# Patient Record
Sex: FEMALE | Race: WHITE | NOT HISPANIC OR LATINO | Employment: OTHER | URBAN - METROPOLITAN AREA
[De-identification: names, ages, dates, MRNs, and addresses within clinical notes are randomized per-mention and may not be internally consistent; named-entity substitution may affect disease eponyms.]

---

## 2021-06-02 ENCOUNTER — RECORDS - HEALTHEAST (OUTPATIENT)
Dept: ADMINISTRATIVE | Facility: CLINIC | Age: 71
End: 2021-06-02

## 2023-11-17 ENCOUNTER — MEDICAL CORRESPONDENCE (OUTPATIENT)
Dept: HEALTH INFORMATION MANAGEMENT | Facility: CLINIC | Age: 73
End: 2023-11-17
Payer: COMMERCIAL

## 2023-11-28 ENCOUNTER — HOSPITAL ENCOUNTER (OUTPATIENT)
Dept: GENERAL RADIOLOGY | Facility: CLINIC | Age: 73
Discharge: HOME | End: 2023-11-28
Attending: PHYSICIAN ASSISTANT | Admitting: PHYSICIAN ASSISTANT
Payer: COMMERCIAL

## 2023-11-28 DIAGNOSIS — M25.552 PAIN IN LEFT HIP: ICD-10-CM

## 2023-11-28 PROCEDURE — 73502 X-RAY EXAM HIP UNI 2-3 VIEWS: CPT

## 2023-12-04 ENCOUNTER — OFFICE VISIT (OUTPATIENT)
Dept: FAMILY MEDICINE | Facility: CLINIC | Age: 73
End: 2023-12-04
Payer: COMMERCIAL

## 2023-12-04 VITALS
BODY MASS INDEX: 24.26 KG/M2 | HEIGHT: 63 IN | OXYGEN SATURATION: 99 % | WEIGHT: 136.9 LBS | HEART RATE: 83 BPM | SYSTOLIC BLOOD PRESSURE: 116 MMHG | RESPIRATION RATE: 16 BRPM | TEMPERATURE: 96.6 F | DIASTOLIC BLOOD PRESSURE: 70 MMHG

## 2023-12-04 DIAGNOSIS — Z86.19 HISTORY OF HEPATITIS C: ICD-10-CM

## 2023-12-04 DIAGNOSIS — I25.10 CORONARY ARTERY DISEASE INVOLVING NATIVE CORONARY ARTERY OF NATIVE HEART WITHOUT ANGINA PECTORIS: ICD-10-CM

## 2023-12-04 DIAGNOSIS — F10.21 ALCOHOL DEPENDENCE IN REMISSION (H): ICD-10-CM

## 2023-12-04 DIAGNOSIS — J44.9 CHRONIC OBSTRUCTIVE PULMONARY DISEASE, UNSPECIFIED COPD TYPE (H): ICD-10-CM

## 2023-12-04 DIAGNOSIS — F32.4 MAJOR DEPRESSIVE DISORDER WITH SINGLE EPISODE, IN PARTIAL REMISSION (H): ICD-10-CM

## 2023-12-04 DIAGNOSIS — M75.102 ROTATOR CUFF SYNDROME OF BOTH SHOULDERS: ICD-10-CM

## 2023-12-04 DIAGNOSIS — G89.29 CHRONIC LOW BACK PAIN, UNSPECIFIED BACK PAIN LATERALITY, UNSPECIFIED WHETHER SCIATICA PRESENT: ICD-10-CM

## 2023-12-04 DIAGNOSIS — Z01.818 PREOP GENERAL PHYSICAL EXAM: Primary | ICD-10-CM

## 2023-12-04 DIAGNOSIS — Z79.1 NSAID LONG-TERM USE: ICD-10-CM

## 2023-12-04 DIAGNOSIS — M54.50 CHRONIC LOW BACK PAIN, UNSPECIFIED BACK PAIN LATERALITY, UNSPECIFIED WHETHER SCIATICA PRESENT: ICD-10-CM

## 2023-12-04 DIAGNOSIS — I25.2 HISTORY OF MI (MYOCARDIAL INFARCTION): ICD-10-CM
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
## 2023-12-04 DIAGNOSIS — S06.9X9S TRAUMATIC BRAIN INJURY WITH LOSS OF CONSCIOUSNESS, SEQUELA (H): ICD-10-CM

## 2023-12-04 DIAGNOSIS — M75.101 ROTATOR CUFF SYNDROME OF BOTH SHOULDERS: ICD-10-CM

## 2023-12-04 PROBLEM — B18.2 CHRONIC HEPATITIS C WITHOUT HEPATIC COMA (H): Status: ACTIVE | Noted: 2023-12-04

## 2023-12-04 PROBLEM — B18.2 CHRONIC HEPATITIS C WITHOUT HEPATIC COMA (H): Status: RESOLVED | Noted: 2023-12-04 | Resolved: 2023-12-04

## 2023-12-04 LAB
ANION GAP SERPL CALCULATED.3IONS-SCNC: 13 MMOL/L (ref 7–15)
BUN SERPL-MCNC: 19.2 MG/DL (ref 8–23)
CALCIUM SERPL-MCNC: 9.1 MG/DL (ref 8.8–10.2)
CHLORIDE SERPL-SCNC: 101 MMOL/L (ref 98–107)
CHOLEST SERPL-MCNC: 219 MG/DL
CREAT SERPL-MCNC: 0.81 MG/DL (ref 0.51–0.95)
DEPRECATED HCO3 PLAS-SCNC: 22 MMOL/L (ref 22–29)
EGFRCR SERPLBLD CKD-EPI 2021: 77 ML/MIN/1.73M2
ERYTHROCYTE [DISTWIDTH] IN BLOOD BY AUTOMATED COUNT: 14 % (ref 10–15)
GLUCOSE SERPL-MCNC: 92 MG/DL (ref 70–99)
HCT VFR BLD AUTO: 40.8 % (ref 35–47)
HDLC SERPL-MCNC: 39 MG/DL
HGB BLD-MCNC: 13.2 G/DL (ref 11.7–15.7)
LDLC SERPL CALC-MCNC: 140 MG/DL
MCH RBC QN AUTO: 26.8 PG (ref 26.5–33)
MCHC RBC AUTO-ENTMCNC: 32.4 G/DL (ref 31.5–36.5)
MCV RBC AUTO: 83 FL (ref 78–100)
NONHDLC SERPL-MCNC: 180 MG/DL
PLATELET # BLD AUTO: 278 10E3/UL (ref 150–450)
POTASSIUM SERPL-SCNC: 4.8 MMOL/L (ref 3.4–5.3)
RBC # BLD AUTO: 4.93 10E6/UL (ref 3.8–5.2)
SODIUM SERPL-SCNC: 136 MMOL/L (ref 135–145)
TRIGL SERPL-MCNC: 202 MG/DL
WBC # BLD AUTO: 7.1 10E3/UL (ref 4–11)

## 2023-12-04 PROCEDURE — 99205 OFFICE O/P NEW HI 60 MIN: CPT | Mod: 25 | Performed by: PHYSICIAN ASSISTANT

## 2023-12-04 PROCEDURE — 36415 COLL VENOUS BLD VENIPUNCTURE: CPT | Performed by: PHYSICIAN ASSISTANT

## 2023-12-04 PROCEDURE — 93000 ELECTROCARDIOGRAM COMPLETE: CPT | Performed by: PHYSICIAN ASSISTANT

## 2023-12-04 PROCEDURE — 85027 COMPLETE CBC AUTOMATED: CPT | Performed by: PHYSICIAN ASSISTANT

## 2023-12-04 PROCEDURE — 80061 LIPID PANEL: CPT | Performed by: PHYSICIAN ASSISTANT

## 2023-12-04 PROCEDURE — 80048 BASIC METABOLIC PNL TOTAL CA: CPT | Performed by: PHYSICIAN ASSISTANT

## 2023-12-04 RX ORDER — CELECOXIB 200 MG/1
200 CAPSULE ORAL DAILY
COMMUNITY
Start: 2023-09-06

## 2023-12-04 RX ORDER — OXYCODONE AND ACETAMINOPHEN 5; 325 MG/1; MG/1
1 TABLET ORAL EVERY 6 HOURS PRN
COMMUNITY

## 2023-12-04 RX ORDER — SERTRALINE HYDROCHLORIDE 100 MG/1
150 TABLET, FILM COATED ORAL DAILY
COMMUNITY

## 2023-12-04 RX ORDER — SUVOREXANT 20 MG/1
20 TABLET, FILM COATED ORAL AT BEDTIME
COMMUNITY

## 2023-12-04 RX ORDER — ALENDRONATE SODIUM 70 MG/1
70 TABLET ORAL
COMMUNITY
Start: 2022-06-11

## 2023-12-04 RX ORDER — ATOMOXETINE 40 MG/1
40 CAPSULE ORAL DAILY
COMMUNITY
Start: 2023-12-01

## 2023-12-04 RX ORDER — ROSUVASTATIN CALCIUM 20 MG/1
20 TABLET, COATED ORAL AT BEDTIME
COMMUNITY
Start: 2022-05-29

## 2023-12-04 ASSESSMENT — PATIENT HEALTH QUESTIONNAIRE - PHQ9
10. IF YOU CHECKED OFF ANY PROBLEMS, HOW DIFFICULT HAVE THESE PROBLEMS MADE IT FOR YOU TO DO YOUR WORK, TAKE CARE OF THINGS AT HOME, OR GET ALONG WITH OTHER PEOPLE: SOMEWHAT DIFFICULT
SUM OF ALL RESPONSES TO PHQ QUESTIONS 1-9: 6
SUM OF ALL RESPONSES TO PHQ QUESTIONS 1-9: 6

## 2023-12-04 ASSESSMENT — PAIN SCALES - GENERAL: PAINLEVEL: SEVERE PAIN (6)

## 2023-12-04 NOTE — PATIENT INSTRUCTIONS
Preparing for Your Surgery  Getting started  A nurse will call you to review your health history and instructions. They will give you an arrival time based on your scheduled surgery time. Please be ready to share:  Your doctor's clinic name and phone number  Your medical, surgical, and anesthesia history  A list of allergies and sensitivities  A list of medicines, including herbal treatments and over-the-counter drugs  Whether the patient has a legal guardian (ask how to send us the papers in advance)  Please tell us if you're pregnant--or if there's any chance you might be pregnant. Some surgeries may injure a fetus (unborn baby), so they require a pregnancy test. Surgeries that are safe for a fetus don't always need a test, and you can choose whether to have one.   If you have a child who's having surgery, please ask for a copy of Preparing for Your Child's Surgery.    Preparing for surgery  Within 10 to 30 days of surgery: Have a pre-op exam (sometimes called an H&P, or History and Physical). This can be done at a clinic or pre-operative center.  If you're having a , you may not need this exam. Talk to your care team.  At your pre-op exam, talk to your care team about all medicines you take. If you need to stop any medicines before surgery, ask when to start taking them again.  We do this for your safety. Many medicines can make you bleed too much during surgery. Some change how well surgery (anesthesia) drugs work.  Call your insurance company to let them know you're having surgery. (If you don't have insurance, call [phone].)  Call your clinic if there's any change in your health. This includes signs of a cold or flu (sore throat, runny nose, cough, rash, fever). It also includes a scrape or scratch near the surgery site.  If you have questions on the day of surgery, call your hospital or surgery center.  Eating and drinking guidelines  For your safety: Unless your surgeon tells you otherwise,  follow the guidelines below.  Eat and drink as usual until 8 hours before you arrive for surgery. After that, no food or milk.  Drink clear liquids until 2 hours before you arrive. These are liquids you can see through, like water, Gatorade, and Propel Water. They also include plain black coffee and tea (no cream or milk), candy, and breath mints. You can spit out gum when you arrive.  If you drink alcohol: Stop drinking it the night before surgery.  If your care team tells you to take medicine on the morning of surgery, it's okay to take it with a sip of water.  Preventing infection  Shower or bathe the night before and morning of your surgery. Follow the instructions your clinic gave you. (If no instructions, use regular soap.)  Don't shave or clip hair near your surgery site. We'll remove the hair if needed.  Don't smoke or vape the morning of surgery. You may chew nicotine gum up to 2 hours before surgery. A nicotine patch is okay.  Note: Some surgeries require you to completely quit smoking and nicotine. Check with your surgeon.  Your care team will make every effort to keep you safe from infection. We will:  Clean our hands often with soap and water (or an alcohol-based hand rub).  Clean the skin at your surgery site with a special soap that kills germs.  Give you a special gown to keep you warm. (Cold raises the risk of infection.)  Wear special hair covers, masks, gowns and gloves during surgery.  Give antibiotic medicine, if prescribed. Not all surgeries need antibiotics.  What to bring on the day of surgery  Photo ID and insurance card  Copy of your health care directive, if you have one  Glasses and hearing aids (bring cases)  You can't wear contacts during surgery  Inhaler and eye drops, if you use them (tell us about these when you arrive)  CPAP machine or breathing device, if you use them  A few personal items, if spending the night  If you have . . .  A pacemaker, ICD (cardiac defibrillator) or other  implant: Bring the ID card.  An implanted stimulator: Bring the remote control.  A legal guardian: Bring a copy of the certified (court-stamped) guardianship papers.  Please remove any jewelry, including body piercings. Leave jewelry and other valuables at home.  If you're going home the day of surgery  You must have a responsible adult drive you home. They should stay with you overnight as well.  If you don't have someone to stay with you, and you aren't safe to go home alone, we may keep you overnight. Insurance often won't pay for this.  After surgery  If it's hard to control your pain or you need more pain medicine, please call your surgeon's office.  Questions?   If you have any questions for your care team, list them here: _________________________________________________________________________________________________________________________________________________________________________ ____________________________________ ____________________________________ ____________________________________  For informational purposes only. Not to replace the advice of your health care provider. Copyright   2003, 2019 [employer]. All rights reserved. Clinically reviewed by Nancy Connelly MD. [employer] 367040 - REV 12/22.    How to Take Your Medication Before Surgery  - HOLD (do not take) your celebrex starting today  Do not take the strattera the morning of surgery.   Do take the omeprazole and sertraline the morning of surgery.   Ok to take your normal nighttime medications.    You should not take ibuprofen/advil (an NSAID medication), because you are taking celebrex which is also an NSAID medication. This increases risk of ulcer, kidney injury and cardiovascular risk.     I referred you to sports medicine and physical therapy for your shoulders.   Rotator cuff syndrome    Do schedule an annual exam with a provider in [place]
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
## 2023-12-04 NOTE — PROGRESS NOTES
"  {PROVIDER CHARTING PREFERENCE:482070}    Subjective   Emily is a 73 year old, presenting for the following health issues:  Pre-Op Exam and Arm Pain      12/4/2023    10:44 AM   Additional Questions   Roomed by Lyndsay MCCRAY CMA   Accompanied by self         12/4/2023    10:44 AM   Patient Reported Additional Medications   Patient reports taking the following new medications zoloft, rovustatin 20mg, Belsomra, Vyvanse, oxycondone, fosamax       HPI     {MA/LPN/RN Pre-Provider Visit Orders- hCG/UA/Strep (Optional):790578}  Pain History:  When did you first notice your pain? Since September    Have you seen anyone else for your pain? No  How has your pain affected your ability to work? Not currently working - unrelated to pain  Where in your body do you have pain? Musculoskeletal problem/pain  Onset/Duration: started in September   Description  Location: shoulders  - bilateral, and biceps   Joint Swelling: No  Redness: No  Pain: yes  Warmth: No  Intensity:  moderate  Progression of Symptoms:  worsening  Accompanying signs and symptoms:   Fevers: No  Numbness/tingling/weakness: YES- weakness   History  Trauma to the area: YES- possibly while moving and lifting something heavy   Recent illness:  No  Previous similar problem: No  Previous evaluation:  No  Precipitating or alleviating factors:  Aggravating factors include: overuse  Therapies tried and outcome: heat, rest   {Links to Pain Management SmartSet and MNPMP (Optional) :574634}  {additonal problems for provider to add (Optional):012923}      Review of Systems   {ROS COMP (Optional):308441}      Objective    /70   Pulse 83   Temp (!) 96.6  F (35.9  C) (Temporal)   Resp 16   Ht 1.593 m (5' 2.72\")   Wt 62.1 kg (136 lb 14.4 oz)   SpO2 99%   BMI 24.47 kg/m    Body mass index is 24.47 kg/m .  Physical Exam   {Exam List (Optional):924968}    {Diagnostic Test Results (Optional):833244}    {AMBULATORY ATTESTATION (Optional):537092}              "

## 2023-12-05 ENCOUNTER — TELEPHONE (OUTPATIENT)
Dept: FAMILY MEDICINE | Facility: CLINIC | Age: 73
End: 2023-12-05
Payer: COMMERCIAL

## 2023-12-05 PROBLEM — M75.102 ROTATOR CUFF SYNDROME OF BOTH SHOULDERS: Status: ACTIVE | Noted: 2023-12-05

## 2023-12-05 PROBLEM — Z86.19 HISTORY OF HEPATITIS C: Status: ACTIVE | Noted: 2023-12-05

## 2023-12-05 PROBLEM — Z79.1 NSAID LONG-TERM USE: Status: ACTIVE | Noted: 2023-12-05

## 2023-12-05 PROBLEM — M75.101 ROTATOR CUFF SYNDROME OF BOTH SHOULDERS: Status: ACTIVE | Noted: 2023-12-05

## 2023-12-05 NOTE — TELEPHONE ENCOUNTER
----- Message from Fiordaliza Blake PA-C sent at 12/5/2023  2:22 PM CST -----  Please call pt with the following message:      Complete blood count was normal: hemoglobin (measure of red blood cells, the test for anemia), the white blood cells (fight infection), and platelets (form plug/clot at sites of bleeding) were all normal.     Kidney function (serum creatinine and GFR) and electrolyte tests are normal.      Cholesterol: LDL is moderately elevated despite taking the rosuvastatin. Do continue on the medication. If you are missing doses or have been out of your medication that could be a factor. Continue heart healthy eating habits and active lifestyle     (If pt does not have MyChart, please mail a letter with results and my comments.)  Fiordaliza Blake PA-C

## 2023-12-05 NOTE — TELEPHONE ENCOUNTER
There seems to be open variables still in this note and says that this encounter/note is still open in epic. Routing to provider to complete and route back to team when done for them to fax preop.    Iza Lindsay CMA ([facility])

## 2023-12-05 NOTE — TELEPHONE ENCOUNTER
Called and spoke with patient. Gregory reports she wishes she was able to be more active but due to Rotator Cuff and tailbone concerns she has not been as active as she would like. Hopes to increase activity level after surgery.     Please fax Pre-Op to the below number.     Surgery/Procedure: Back surgery - Intercept   Surgery Location: [facility]   Surgeon: [email or web address]  Surgery Date: 12/07/23  Time of Surgery: 10:30  Where patient plans to recover: At home with family  Fax number for surgical facility: [phone]    MEGHA Roman, RN  [facility] ~ Registered Nurse  Clinic Triage ~ [facility]  December 5, 2023

## 2023-12-20 ENCOUNTER — TRANSCRIBE ORDERS (OUTPATIENT)
Dept: OTHER | Age: 73
End: 2023-12-20

## 2023-12-20 DIAGNOSIS — M25.552 PAIN IN LEFT HIP: Primary | ICD-10-CM

## 2024-01-11 ENCOUNTER — THERAPY VISIT (OUTPATIENT)
Dept: PHYSICAL THERAPY | Facility: CLINIC | Age: 74
End: 2024-01-11
Attending: PHYSICIAN ASSISTANT
Payer: COMMERCIAL

## 2024-01-11 DIAGNOSIS — M75.102 ROTATOR CUFF SYNDROME OF BOTH SHOULDERS: ICD-10-CM

## 2024-01-11 DIAGNOSIS — M75.101 ROTATOR CUFF SYNDROME OF BOTH SHOULDERS: ICD-10-CM

## 2024-01-11 PROCEDURE — 97161 PT EVAL LOW COMPLEX 20 MIN: CPT | Mod: GP | Performed by: PHYSICAL THERAPIST

## 2024-01-11 PROCEDURE — 97035 APP MDLTY 1+ULTRASOUND EA 15: CPT | Mod: GP | Performed by: PHYSICAL THERAPIST

## 2024-01-11 PROCEDURE — 97110 THERAPEUTIC EXERCISES: CPT | Mod: GP | Performed by: PHYSICAL THERAPIST

## 2024-01-11 NOTE — PROGRESS NOTES
PHYSICAL THERAPY EVALUATION  Type of Visit: Evaluation    See electronic medical record for Abuse and Falls Screening details.    Subjective   Patient reports she was getting ready to move this fall and did a lot of lifting and carring and has had pain in right shoulder since then . PMH had back surgery years ago and recently has intercept back Rx , had dislocated elbow 2022 and is right handed, arthritis , depression , implanted device , numb/tingling in perianal area, osteoporsis , weakness , pain at night or rest , smoking , vision problems . For the right shoulder rest and meds are the best , has trouble sleeping will wake her up       Presenting condition or subjective complaint:    Date of onset: 11/01/23    Relevant medical history:     Dates & types of surgery:      Prior diagnostic imaging/testing results:       Prior therapy history for the same diagnosis, illness or injury:        Prior Level of Function  Transfers: Independent  Ambulation: Independent  ADL: Independent  IADL:  independent     Living Environment  Social support:   lives with her daughter in her own duplex   Type of home:     Stairs to enter the home:         Ramp:     Stairs inside the home:         Help at home:    Equipment owned:       Employment:    retired works 1day week at [employer]  Hobbies/Interests:  [employer] and shopping selling     Patient goals for therapy:  decrease pain and improve function     Pain assessment:      Objective   SHOULDER EVALUATION  PAIN: 5-9/10   INTEGUMENTARY (edema, incisions):   POSTURE: slight forward head and rounded shoulders   GAIT:   Weightbearing Status:   Assistive Device(s):   Gait Deviations:   BALANCE/PROPRIOCEPTION:   WEIGHTBEARING ALIGNMENT:   ROM:   Arom flexion right 155 left 155 external rotation right 65 left 70 internal rotation left T12 right L2   STRENGTH: B shoulder strength is equal but with flexion , internal and external rotation 4-/5 discomfort , elbow flexion and extension  4/5 no discomfort   FLEXIBILITY:   SPECIAL TESTS: positive impingement on right   PALPATION: pain is right shoulder and upper arm   JOINT MOBILITY:   CERVICAL SCREEN:     Assessment & Plan   CLINICAL IMPRESSIONS  Medical Diagnosis: rotator cuff syndrome of B shoulders M75.101 right shoulder    Treatment Diagnosis: right shoulder pain   Impression/Assessment: Patient is a 73 year old female with right shoulder complaints.  The following significant findings have been identified: Pain, Decreased strength, Impaired muscle performance, and Decreased activity tolerance. These impairments interfere with their ability to perform self care tasks, work tasks, recreational activities, and household chores as compared to previous level of function.     Clinical Decision Making (Complexity):  Clinical Presentation: Stable/Uncomplicated  Clinical Presentation Rationale: based on medical and personal factors listed in PT evaluation  Clinical Decision Making (Complexity): Low complexity    PLAN OF CARE  Treatment Interventions:  Modalities:  as needed , US   Interventions: Manual Therapy, Neuromuscular Re-education, Therapeutic Activity, Therapeutic Exercise    Long Term Goals     PT Goal 1  Goal Identifier: 1  Goal Description: instruction in HEP and compliant with it 5 of 7 days to improve quality of life  Target Date: 04/09/24  PT Goal 2  Goal Identifier: 2  Goal Description: patient to have reduction in pain level currently 5-9/10 goal is 0-3/10  Target Date: 04/09/24  PT Goal 3  Goal Identifier: 3  Goal Description: patient to have improved tolerance to activity currently spadi 68 goal is less than 30  Target Date: 04/09/24      Frequency of Treatment: 1x week x 12 weeks  Duration of Treatment:      Recommended Referrals to Other Professionals:   Education Assessment:   Learner/Method: Patient;Listening;Reading;Demonstration;Pictures/Video;No Barriers to Learning    Risks and benefits of evaluation/treatment have been  explained.   Patient/Family/caregiver agrees with Plan of Care.     Evaluation Time:     PT Eval, Low Complexity Minutes (68095): 15       Signing Clinician: Sona Garcia, PT      [facility]                                                                                   OUTPATIENT PHYSICAL THERAPY      PLAN OF TREATMENT FOR OUTPATIENT REHABILITATION   Patient's Last Name, First Name, Emily Kasper YOB: 1950   Provider's Name   [facility]   Medical Record No.  [id number]     Onset Date: 11/01/23  Start of Care Date: 01/11/24     Medical Diagnosis:  rotator cuff syndrome of B shoulders M75.101 right shoulder      PT Treatment Diagnosis:  right shoulder pain Plan of Treatment  Frequency/Duration: 1x week x 12 weeks/      Certification date from 01/11/24 to 04/09/24         See note for plan of treatment details and functional goals     Sona Garcia, PT                         I CERTIFY THE NEED FOR THESE SERVICES FURNISHED UNDER        THIS PLAN OF TREATMENT AND WHILE UNDER MY CARE     (Physician attestation of this document indicates review and certification of the therapy plan).              Referring Provider:  Fiordaliza Blake    Initial Assessment  See Epic Evaluation- Start of Care Date: 01/11/24

## 2024-02-06 NOTE — PROGRESS NOTES
DISCHARGE  Reason for Discharge: Patient chooses to discontinue therapy.  Change in medical status.  Patient called and cancelled all her therapy Rx as she had covid   Equipment Issued: none    Discharge Plan: Patient to continue home program.    Referring Provider:  Fiordaliza Blake     01/11/24 0500   Appointment Info   Signing clinician's name / credentials joseph guajardo PT   Total/Authorized Visits [email or web address]   Medical Diagnosis rotator cuff syndrome of B shoulders M75.101 right shoulder   PT Tx Diagnosis right shoulder pain   Quick Adds Certification   Progress Note/Certification   Start of Care Date 01/11/24   Onset of illness/injury or Date of Surgery 11/01/23   Therapy Frequency 1x week x 12 weeks   Certification date from 01/11/24   Certification date to 04/09/24   GOALS   PT Goals 2;3   PT Goal 1   Goal Identifier 1   Goal Description instruction in HEP and compliant with it 5 of 7 days to improve quality of life   Target Date 04/09/24   PT Goal 2   Goal Identifier 2   Goal Description patient to have reduction in pain level currently 5-9/10 goal is 0-3/10   Target Date 04/09/24   PT Goal 3   Goal Identifier 3   Goal Description patient to have improved tolerance to activity currently spadi 68 goal is less than 30   Target Date 04/09/24   Objective Measures   Objective Measures Objective Measure 1   Objective Measure 1   Objective Measure right shoulder pain 5-9/10 spadi 67.69   PT Modalities   PT Modalities Ultrasound   Ultrasound   Ultrasound Minutes (13946) 15   Patient Response/Progress decrease pain after Rx   Treatment Detail sitting with right UE supported % at 1.5 x 11 minutes to right shoulder and upper arm   Treatment Interventions (PT)   Interventions Therapeutic Procedure/Exercise   Therapeutic Procedure/Exercise   Therapeutic Procedures: strength, endurance, ROM, flexibillity minutes (61726) 30   Ther Proc 1 - Details instruction in HEP and exercises , 1 avoid impingement AROM , 2 1 lb  10x 1 supine press up , with flexion / extension , side internal and external rotation   Skilled Intervention instruction in HEP and exercises   Patient Response/Progress good understanding and tolerance   Eval/Assessments   PT Eval, Low Complexity Minutes (74556) 15   Education   Learner/Method Patient;Listening;Reading;Demonstration;Pictures/Video;No Barriers to Learning   Plan   Home program instruction in HEP and exercises , 1 avoid impingement AROM , 2 1 lb 10x 1 supine press up , with flexion / extension , side internal and external rotation   Updates to plan of care 1x week x 12 weeks   Plan for next session exercises , HEP and US   Total Session Time   Timed Code Treatment Minutes 45   Total Treatment Time (sum of timed and untimed services) 60

## 2024-09-12 ENCOUNTER — TRANSFERRED RECORDS (OUTPATIENT)
Dept: MULTI SPECIALTY CLINIC | Facility: CLINIC | Age: 74
End: 2024-09-12

## 2024-09-12 LAB
CHOLESTEROL (EXTERNAL): 229 MG/DL (ref 0–200)
HDLC SERPL-MCNC: 37.1 MG/DL (ref 40–60)
LDL CHOLESTEROL CALCULATED (EXTERNAL): 157 MG/DL (ref 0–130)
TRIGLYCERIDES (EXTERNAL): 174 MG/DL (ref 0–200)

## 2025-01-14 ENCOUNTER — TELEPHONE (OUTPATIENT)
Dept: FAMILY MEDICINE | Facility: CLINIC | Age: 75
End: 2025-01-14
Payer: COMMERCIAL

## 2025-01-14 NOTE — TELEPHONE ENCOUNTER
Patient Quality Outreach    Patient is due for the following:   Depression  -  PHQ-9 needed  Physical Annual Wellness Visit,  - completed at [facility] on 9/12/2024 per Care Everywhere      Topic Date Due    Zoster (Shingles) Vaccine (1 of 2) Never done    Hepatitis B Vaccine (1 of 3 - Risk 3-dose series) Never done       Action(s) Taken:   No follow up needed at this time.    Type of outreach:    Copy of PHQ9 mailed to patient.    Questions for provider review:    None           Iza Lindsay, CMA

## 2025-01-14 NOTE — LETTER
[facility] [place]  [address], [address]  [place] [address]  [phone]       January 14, 2025    Emily Hdz  [address] [place] [address]    Dear Emily,    This questionnaire is about depression for your upcoming visit or contact, and your care team may not see this information before then.  We care about you.  If at any time you feel unsafe or have concerns about the safety of others please take immediate action by calling [phone], for mental health crisis phone support 24 hours a day, 365 days per year.  As always, you can also go to your local ER, or call 911 if you have immediate safety concerns.    Please complete the enclosed questionnaire and return to us at the address above.    Thank you for trusting [facility] [place] and we appreciate the opportunity to serve you.  We look forward to supporting your healthcare needs in the future.    Healthy Regards,    Your [facility] Team

## 2025-03-27 ENCOUNTER — HOSPITAL ENCOUNTER (EMERGENCY)
Facility: CLINIC | Age: 75
Discharge: HOME | End: 2025-03-27
Attending: EMERGENCY MEDICINE
Payer: COMMERCIAL

## 2025-03-27 VITALS
WEIGHT: 128 LBS | BODY MASS INDEX: 22.68 KG/M2 | DIASTOLIC BLOOD PRESSURE: 85 MMHG | HEIGHT: 63 IN | RESPIRATION RATE: 18 BRPM | HEART RATE: 103 BPM | TEMPERATURE: 97.5 F | OXYGEN SATURATION: 96 % | SYSTOLIC BLOOD PRESSURE: 135 MMHG

## 2025-03-27 DIAGNOSIS — F17.200 TOBACCO USE DISORDER: ICD-10-CM

## 2025-03-27 DIAGNOSIS — R89.9 ABNORMAL LABORATORY TEST: ICD-10-CM

## 2025-03-27 LAB
ANION GAP SERPL CALCULATED.3IONS-SCNC: 10 MMOL/L (ref 7–15)
ATRIAL RATE - MUSE: 83 BPM
BASE EXCESS BLDV CALC-SCNC: 5 MMOL/L (ref -3–3)
BASOPHILS # BLD AUTO: 0 10E3/UL (ref 0–0.2)
BASOPHILS NFR BLD AUTO: 0 %
BUN SERPL-MCNC: 20.6 MG/DL (ref 8–23)
CALCIUM SERPL-MCNC: 10 MG/DL (ref 8.8–10.4)
CHLORIDE SERPL-SCNC: 102 MMOL/L (ref 98–107)
CREAT SERPL-MCNC: 1.01 MG/DL (ref 0.51–0.95)
DIASTOLIC BLOOD PRESSURE - MUSE: NORMAL MMHG
EGFRCR SERPLBLD CKD-EPI 2021: 59 ML/MIN/1.73M2
EOSINOPHIL # BLD AUTO: 0 10E3/UL (ref 0–0.7)
EOSINOPHIL NFR BLD AUTO: 1 %
ERYTHROCYTE [DISTWIDTH] IN BLOOD BY AUTOMATED COUNT: 13.5 % (ref 10–15)
GLUCOSE SERPL-MCNC: 99 MG/DL (ref 70–99)
HCO3 BLDV-SCNC: 31 MMOL/L (ref 21–28)
HCO3 SERPL-SCNC: 28 MMOL/L (ref 22–29)
HCT VFR BLD AUTO: 41.2 % (ref 35–47)
HGB BLD-MCNC: 13.6 G/DL (ref 11.7–15.7)
IMM GRANULOCYTES # BLD: 0 10E3/UL
IMM GRANULOCYTES NFR BLD: 0 %
INTERPRETATION ECG - MUSE: NORMAL
LYMPHOCYTES # BLD AUTO: 1.7 10E3/UL (ref 0.8–5.3)
LYMPHOCYTES NFR BLD AUTO: 27 %
MCH RBC QN AUTO: 27.3 PG (ref 26.5–33)
MCHC RBC AUTO-ENTMCNC: 33 G/DL (ref 31.5–36.5)
MCV RBC AUTO: 83 FL (ref 78–100)
MONOCYTES # BLD AUTO: 0.5 10E3/UL (ref 0–1.3)
MONOCYTES NFR BLD AUTO: 7 %
NEUTROPHILS # BLD AUTO: 4.1 10E3/UL (ref 1.6–8.3)
NEUTROPHILS NFR BLD AUTO: 65 %
NRBC # BLD AUTO: 0 10E3/UL
NRBC BLD AUTO-RTO: 0 /100
O2/TOTAL GAS SETTING VFR VENT: 21 %
OXYHGB MFR BLDV: 29 % (ref 70–75)
P AXIS - MUSE: 59 DEGREES
PCO2 BLDV: 52 MM HG (ref 40–50)
PH BLDV: 7.39 [PH] (ref 7.32–7.43)
PHOSPHATE SERPL-MCNC: 4.2 MG/DL (ref 2.5–4.5)
PLATELET # BLD AUTO: 216 10E3/UL (ref 150–450)
PO2 BLDV: 18 MM HG (ref 25–47)
POTASSIUM SERPL-SCNC: 5.3 MMOL/L (ref 3.4–5.3)
PR INTERVAL - MUSE: 152 MS
QRS DURATION - MUSE: 88 MS
QT - MUSE: 360 MS
QTC - MUSE: 423 MS
R AXIS - MUSE: 32 DEGREES
RBC # BLD AUTO: 4.98 10E6/UL (ref 3.8–5.2)
SAO2 % BLDV: 30.8 % (ref 70–75)
SODIUM SERPL-SCNC: 140 MMOL/L (ref 135–145)
SYSTOLIC BLOOD PRESSURE - MUSE: NORMAL MMHG
T AXIS - MUSE: 57 DEGREES
URATE SERPL-MCNC: 3.2 MG/DL (ref 2.4–5.7)
VENTRICULAR RATE- MUSE: 83 BPM
WBC # BLD AUTO: 6.4 10E3/UL (ref 4–11)

## 2025-03-27 PROCEDURE — 85018 HEMOGLOBIN: CPT | Performed by: EMERGENCY MEDICINE

## 2025-03-27 PROCEDURE — 80048 BASIC METABOLIC PNL TOTAL CA: CPT | Performed by: EMERGENCY MEDICINE

## 2025-03-27 PROCEDURE — 82805 BLOOD GASES W/O2 SATURATION: CPT | Performed by: EMERGENCY MEDICINE

## 2025-03-27 PROCEDURE — 93005 ELECTROCARDIOGRAM TRACING: CPT | Performed by: EMERGENCY MEDICINE

## 2025-03-27 PROCEDURE — 84100 ASSAY OF PHOSPHORUS: CPT | Performed by: EMERGENCY MEDICINE

## 2025-03-27 PROCEDURE — 85004 AUTOMATED DIFF WBC COUNT: CPT | Performed by: EMERGENCY MEDICINE

## 2025-03-27 PROCEDURE — 36415 COLL VENOUS BLD VENIPUNCTURE: CPT | Performed by: EMERGENCY MEDICINE

## 2025-03-27 PROCEDURE — 84550 ASSAY OF BLOOD/URIC ACID: CPT | Performed by: EMERGENCY MEDICINE

## 2025-03-27 PROCEDURE — 93010 ELECTROCARDIOGRAM REPORT: CPT | Performed by: EMERGENCY MEDICINE

## 2025-03-27 PROCEDURE — 99283 EMERGENCY DEPT VISIT LOW MDM: CPT | Performed by: EMERGENCY MEDICINE

## 2025-03-27 PROCEDURE — 99284 EMERGENCY DEPT VISIT MOD MDM: CPT | Performed by: EMERGENCY MEDICINE

## 2025-03-27 RX ORDER — LISDEXAMFETAMINE DIMESYLATE 50 MG
50 CAPSULE ORAL EVERY MORNING
COMMUNITY

## 2025-03-27 RX ORDER — ROSUVASTATIN CALCIUM 40 MG/1
40 TABLET, COATED ORAL AT BEDTIME
COMMUNITY
Start: 2025-03-25

## 2025-03-27 RX ORDER — AZELASTINE 1 MG/ML
2 SPRAY, METERED NASAL DAILY PRN
COMMUNITY
Start: 2025-03-25

## 2025-03-27 RX ORDER — RAMELTEON 8 MG/1
4-8 TABLET ORAL
COMMUNITY

## 2025-03-27 ASSESSMENT — ENCOUNTER SYMPTOMS
WHEEZING: 0
BRUISES/BLEEDS EASILY: 0
CONFUSION: 0
CHILLS: 0
SORE THROAT: 0
ARTHRALGIAS: 0
WEAKNESS: 0
SHORTNESS OF BREATH: 0
HEADACHES: 0
DIFFICULTY URINATING: 0
CHEST TIGHTNESS: 0
EYE REDNESS: 0
TROUBLE SWALLOWING: 0
CONSTITUTIONAL NEGATIVE: 1
FATIGUE: 0
VOMITING: 0
SINUS PRESSURE: 0
MYALGIAS: 0
ABDOMINAL PAIN: 0
DYSURIA: 0
COUGH: 0
EYE DISCHARGE: 0
BLOOD IN STOOL: 0
NECK STIFFNESS: 0
FEVER: 0

## 2025-03-27 ASSESSMENT — ACTIVITIES OF DAILY LIVING (ADL)
ADLS_ACUITY_SCORE: 41

## 2025-03-27 ASSESSMENT — COLUMBIA-SUICIDE SEVERITY RATING SCALE - C-SSRS
1. IN THE PAST MONTH, HAVE YOU WISHED YOU WERE DEAD OR WISHED YOU COULD GO TO SLEEP AND NOT WAKE UP?: NO
2. HAVE YOU ACTUALLY HAD ANY THOUGHTS OF KILLING YOURSELF IN THE PAST MONTH?: NO
6. HAVE YOU EVER DONE ANYTHING, STARTED TO DO ANYTHING, OR PREPARED TO DO ANYTHING TO END YOUR LIFE?: NO

## 2025-03-27 NOTE — ED PROVIDER NOTES
History     Chief Complaint   Patient presents with    Abnormal Labs     HPI  Emily Hdz is a 74 year old female who presents for abnormal labs.  She was seen by her primary care provider yesterday.  They josemanuel routine blood work and it showed her potassium to be elevated = 6.6.  She was seen yesterday by Anthony Lesch PA who advised the patient come to the emergency department.  Noted in her medical record that her potassium was also elevated on November 7, 2024 = 5.5 but they brought her back to the clinic on April 8 and potassium normalized = 4.9.    Significant past medical history as a pertains to hyperkalemia: The patient does not have a history for increased potassium intake or potassium supplements.  She has no history of CKD or no recent events that would be concerning for MANI.  No history adrenal insufficiency (Efren's disease).  With regards to medications on medication I saw on her list that could suggest concerns as Sandoval 2/end-stage (Celebrex).  No history for any cellular shift due to metabolic acidosis.  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  She has not altered her diet, sleep or activity levels to account for her weight loss.  Does have extensive smoking history.  No history for risk of hyperosmolar state such as uncontrolled diabetes.    Allergies:  No Known Allergies    Problem List:    Patient Active Problem List    Diagnosis Date Noted    Rotator cuff syndrome of both shoulders 12/05/2023     Priority: Medium    NSAID long-term use 12/05/2023     Priority: Medium    History of hepatitis C 12/05/2023     Priority: Medium    Chronic obstructive pulmonary disease, unspecified COPD type (H) 12/04/2023     Priority: Medium    Alcohol dependence in remission (H) 12/04/2023     Priority: Medium    Major depressive disorder with single episode, in partial remission 12/04/2023     Priority: Medium    Traumatic  brain injury with loss of consciousness, sequela 12/04/2023     Priority: Medium    History of MI (myocardial infarction) 12/04/2023     Priority: Medium    Coronary artery disease involving native coronary artery of native heart without angina pectoris 12/04/2023     Priority: Medium    Dermatochalasis 09/29/2014     Priority: Medium        Past Medical History:    Past Medical History:   Diagnosis Date    Pseudophakia of both eyes     TBI (traumatic brain injury) (H) 1998       Past Surgical History:    Past Surgical History:   Procedure Laterality Date    CATARACT IOL, RT/LT Bilateral 01/01/2014    Dr Hawkins in [place]    FOOT SURGERY Right 2022    [facility]- has hardware    LUMBAR LAMINECTOMY  2018    [facility]       Family History:    Family History   Problem Relation Age of Onset    Coronary Artery Disease Mother     Cerebrovascular Disease Father     Pancreatic Cancer Father        Social History:  Marital Status:   [4]  Social History     Tobacco Use    Smoking status: Every Day     Current packs/day: 0.25     Types: Cigarettes    Smokeless tobacco: Never    Tobacco comments:     Smoked since age 18. Quit in pregnancies.    Vaping Use    Vaping status: Never Used   Substance Use Topics    Alcohol use: Not Currently     Comment: quit 2014.    Drug use: Yes     Types: Marijuana     Comment: few times a week.        Medications:    alendronate (FOSAMAX) 70 MG tablet  atomoxetine (STRATTERA) 40 MG capsule  BELSOMRA 20 MG tablet  celecoxib (CELEBREX) 200 MG capsule  omeprazole (PRILOSEC) 20 MG DR capsule  OMEPRAZOLE PO  oxyCODONE-acetaminophen (PERCOCET) 5-325 MG tablet  rosuvastatin (CRESTOR) 20 MG tablet  sertraline (ZOLOFT) 100 MG tablet          Review of Systems   Constitutional: Negative.  Negative for chills, fatigue and fever.   HENT:  Negative for congestion, ear pain, sinus pressure, sore throat and trouble swallowing.    Eyes:  Negative for discharge and redness.   Respiratory:   "Negative for cough, chest tightness, shortness of breath and wheezing.    Cardiovascular:  Negative for chest pain and leg swelling.   Gastrointestinal:  Negative for abdominal pain, blood in stool and vomiting.   Genitourinary:  Negative for difficulty urinating, dysuria and vaginal discharge.   Musculoskeletal:  Negative for arthralgias, myalgias and neck stiffness.   Skin:  Negative for pallor and rash.   Neurological:  Negative for weakness and headaches.   Hematological:  Does not bruise/bleed easily.   Psychiatric/Behavioral:  Negative for confusion.    All other systems reviewed and are negative.  No night sweats to go along with her unexplained 20+ pound weight loss over the last 4 to 6 months.    Physical Exam   BP: 135/85  Pulse: 103  Temp: 97.5  F (36.4  C)  Resp: 18  Height: 160 cm (5' 3\")  Weight: 58.1 kg (128 lb)  SpO2: 96 %      Physical Exam  Vitals and nursing note reviewed.   Constitutional:       General: She is not in acute distress.     Appearance: Normal appearance. She is not diaphoretic.   HENT:      Head: Atraumatic.      Mouth/Throat:      Mouth: Mucous membranes are moist.   Eyes:      General: No scleral icterus.     Conjunctiva/sclera: Conjunctivae normal.   Cardiovascular:      Rate and Rhythm: Normal rate.      Heart sounds: Normal heart sounds.   Pulmonary:      Effort: No respiratory distress.      Breath sounds: Normal breath sounds.   Abdominal:      General: Abdomen is flat.   Musculoskeletal:      Cervical back: Neck supple.   Skin:     General: Skin is warm.      Findings: No rash.   Neurological:      Mental Status: She is alert.         ED Course        Procedures         EKG:  Interpretation by Nikunj Curtis DO.   Indication: Possible hyperkalemia  Sinus rhythm.  Normal axis.  No ectopy.  Slight prominent T wave through all leads.  Nothing severely pronounced but is showing somewhat more pronounced T wave diffusely.    Impressions- abnormal EKG               Results for " orders placed or performed during the hospital encounter of 03/27/25 (from the past 24 hours)   EKG 12-lead, tracing only   Result Value Ref Range    Systolic Blood Pressure  mmHg    Diastolic Blood Pressure  mmHg    Ventricular Rate 83 BPM    Atrial Rate 83 BPM    WY Interval 152 ms    QRS Duration 88 ms     ms    QTc 423 ms    P Axis 59 degrees    R AXIS 32 degrees    T Axis 57 degrees    Interpretation ECG       Sinus rhythm  Normal ECG  When compared with ECG of 08-Aug-2011 13:53,  No significant change was found  Confirmed by SEE ED PROVIDER NOTE FOR, ECG INTERPRETATION ([id number]),  Carroll Wagner ([id number]) on 3/27/2025 5:25:33 PM     CBC with platelets differential    Narrative    The following orders were created for panel order CBC with platelets differential.  Procedure                               Abnormality         Status                     ---------                               -----------         ------                     CBC with platelets and ...[[id number]]                      Final result                 Please view results for these tests on the individual orders.   Basic metabolic panel   Result Value Ref Range    Sodium 140 135 - 145 mmol/L    Potassium 5.3 3.4 - 5.3 mmol/L    Chloride 102 98 - 107 mmol/L    Carbon Dioxide (CO2) 28 22 - 29 mmol/L    Anion Gap 10 7 - 15 mmol/L    Urea Nitrogen 20.6 8.0 - 23.0 mg/dL    Creatinine 1.01 (H) 0.51 - 0.95 mg/dL    GFR Estimate 59 (L) >60 mL/min/1.73m2    Calcium 10.0 8.8 - 10.4 mg/dL    Glucose 99 70 - 99 mg/dL   Blood gas venous   Result Value Ref Range    pH Venous 7.39 7.32 - 7.43    pCO2 Venous 52 (H) 40 - 50 mm Hg    pO2 Venous 18 (L) 25 - 47 mm Hg    Bicarbonate Venous 31 (H) 21 - 28 mmol/L    Base Excess/Deficit Venous 5.0 (H) -3.0 - 3.0 mmol/L    FIO2 21     Oxyhemoglobin Venous 29 (L) 70 - 75 %    O2 Sat, Venous 30.8 (L) 70.0 - 75.0 %    Narrative    In healthy individuals, oxyhemoglobin (O2Hb) and oxygen saturation (SO2) are  approximately equal. In the presence of dyshemoglobins, oxyhemoglobin can be considerably lower than oxygen saturation.   Phosphorus   Result Value Ref Range    Phosphorus 4.2 2.5 - 4.5 mg/dL   Uric acid   Result Value Ref Range    Uric Acid 3.2 2.4 - 5.7 mg/dL   CBC with platelets and differential   Result Value Ref Range    WBC Count 6.4 4.0 - 11.0 10e3/uL    RBC Count 4.98 3.80 - 5.20 10e6/uL    Hemoglobin 13.6 11.7 - 15.7 g/dL    Hematocrit 41.2 35.0 - 47.0 %    MCV 83 78 - 100 fL    MCH 27.3 26.5 - 33.0 pg    MCHC 33.0 31.5 - 36.5 g/dL    RDW 13.5 10.0 - 15.0 %    Platelet Count 216 150 - 450 10e3/uL    % Neutrophils 65 %    % Lymphocytes 27 %    % Monocytes 7 %    % Eosinophils 1 %    % Basophils 0 %    % Immature Granulocytes 0 %    NRBCs per 100 WBC 0 <1 /100    Absolute Neutrophils 4.1 1.6 - 8.3 10e3/uL    Absolute Lymphocytes 1.7 0.8 - 5.3 10e3/uL    Absolute Monocytes 0.5 0.0 - 1.3 10e3/uL    Absolute Eosinophils 0.0 0.0 - 0.7 10e3/uL    Absolute Basophils 0.0 0.0 - 0.2 10e3/uL    Absolute Immature Granulocytes 0.0 <=0.4 10e3/uL    Absolute NRBCs 0.0 10e3/uL       Medications - No data to display    Assessments & Plan (with Medical Decision Making)  Patient came in for elevated potassium.  She was having no symptoms.  Her EKG showed normal findings to maybe slight prominence of the T waves but no dramatic change with T wave elevation diffusely.  We rechecked her potassium unfortunate for her it was normal at 5.5 which indicate that she had hemolysis during the blood draw in the clinic.  I did do some additional test because had she had true hyperkalemia and review of systems noted that she has had more than 20 pounds of unexplained weight loss in the last 4 to 6 months along with extensive smoking history is concerned about tumor lysis syndrome.  Fortunately her phosphorus and uric acid also were normal.  I did talk to her about getting a new primary care provider which she has not had a make electronic  referral to the clinic at [place].  Also I talked her about low-dose CT imaging on an annual basis to help lower her risk for lung cancer related deaths.  She is willing to discuss that with her primary care provider.  Discharged home.     I have reviewed the nursing notes.    I have reviewed the findings, diagnosis, plan and need for follow up with the patient.          New Prescriptions    No medications on file       Final diagnoses:   Tobacco use disorder   Abnormal laboratory test - Erroneous hyperkalemia from blood draw triggering hemolysis       3/27/2025   [facility] EMERGENCY DEPT       Nikunj Crutis,   03/27/25 1913

## 2025-03-27 NOTE — MEDICATION SCRIBE - ADMISSION MEDICATION HISTORY
Medication Scribe Admission Medication History    Admission medication history is complete. The information provided in this note is only as accurate as the sources available at the time of the update.    Information Source(s): Patient and CareEverywhere/SureScripts via in-person    Pertinent Information: Verified no use of pain pump, inhalers or prescription eye drops currently.      Changes made to PTA medication list:  Added: Rozerem, azelastine, Vyvanse, Sertraline 50mg tablet  Deleted: strattera, Belsomra  Changed: crestor from 20mg to 40mg dose    Allergies reviewed with patient and updates made in EHR: yes    Medication History Completed By: BARBARA RODRIGUEZ 3/27/2025 6:09 PM    PTA Med List   Medication Sig Last Dose/Taking    alendronate (FOSAMAX) 70 MG tablet Take 70 mg by mouth every 7 days. Mondays 3/24/2025 Morning    azelastine (ASTELIN) 0.1 % nasal spray Spray 2 sprays in nostril daily as needed for rhinitis. 3/27/2025    celecoxib (CELEBREX) 200 MG capsule Take 200 mg by mouth daily 3/27/2025 at  8:00 AM    omeprazole (PRILOSEC) 20 MG DR capsule Take 20 mg by mouth daily 3/27/2025 at  8:00 AM    oxyCODONE-acetaminophen (PERCOCET) 5-325 MG tablet Take 1 tablet by mouth every 6 hours as needed 3/27/2025 at  8:00 AM    ramelteon (ROZEREM) 8 MG tablet Take 4-8 mg by mouth nightly as needed for sleep. 3/26/2025 Bedtime    rosuvastatin (CRESTOR) 40 MG tablet Take 40 mg by mouth at bedtime. 3/26/2025 Bedtime    sertraline (ZOLOFT) 100 MG tablet Take 100 mg by mouth daily. With 50mg to = 150mg daily dose 3/27/2025 at  8:00 AM    sertraline (ZOLOFT) 50 MG tablet Take 50 mg by mouth daily. With 100mg to = 150mg daily dose 3/27/2025 at  8:00 AM    VYVANSE 50 MG capsule Take 50 mg by mouth every morning. 3/27/2025 at  8:00 AM

## 2025-03-27 NOTE — ED TRIAGE NOTES
Pt reports that she had labs done from her PCP yesterday and her potassium level was 6.2. PCP called and advised her to be seen.      Triage Assessment (Adult)       Row Name 03/27/25 0062          Triage Assessment    Airway WDL WDL        Respiratory WDL    Respiratory WDL WDL        Skin Circulation/Temperature WDL    Skin Circulation/Temperature WDL WDL        Cardiac WDL    Cardiac WDL WDL        Peripheral/Neurovascular WDL    Peripheral Neurovascular WDL WDL        Cognitive/Neuro/Behavioral WDL    Cognitive/Neuro/Behavioral WDL WDL

## 2025-03-28 NOTE — DISCHARGE INSTRUCTIONS
Fortunately your potassium level is normal.  I suspect during the blood draw in the clinic he had fragmentation of your red blood cells causing potassium to be artificially elevated.    I made arrangements for the clinic to call you to set up a new patient physician encounter she can establish a new doctor in the clinic.  I would recommend talk to the about your smoking history and you would greatly benefit by going through annual low-dose CT imaging to help reduce the risk for lung cancer related deaths.  This is a image that allows for much early detection before patients of her would become symptomatic from a lung cancer.

## 2025-05-27 ENCOUNTER — HOSPITAL ENCOUNTER (OUTPATIENT)
Dept: GENERAL RADIOLOGY | Facility: CLINIC | Age: 75
Discharge: HOME | End: 2025-05-27
Payer: COMMERCIAL

## 2025-05-27 DIAGNOSIS — M46.1 SACROILIITIS, NOT ELSEWHERE CLASSIFIED: ICD-10-CM

## 2025-05-27 DIAGNOSIS — M25.552 PAIN IN LEFT HIP: ICD-10-CM

## 2025-05-27 PROCEDURE — 72200 X-RAY EXAM SI JOINTS: CPT

## 2025-05-27 PROCEDURE — 73502 X-RAY EXAM HIP UNI 2-3 VIEWS: CPT

## 2025-06-11 ENCOUNTER — RESULTS FOLLOW-UP (OUTPATIENT)
Dept: FAMILY MEDICINE | Facility: CLINIC | Age: 75
End: 2025-06-11

## 2025-06-11 ENCOUNTER — OFFICE VISIT (OUTPATIENT)
Dept: FAMILY MEDICINE | Facility: CLINIC | Age: 75
End: 2025-06-11
Payer: COMMERCIAL

## 2025-06-11 VITALS
BODY MASS INDEX: 22.15 KG/M2 | HEART RATE: 78 BPM | SYSTOLIC BLOOD PRESSURE: 126 MMHG | OXYGEN SATURATION: 97 % | DIASTOLIC BLOOD PRESSURE: 76 MMHG | WEIGHT: 125 LBS | HEIGHT: 63 IN | TEMPERATURE: 97.9 F

## 2025-06-11 DIAGNOSIS — L98.9 SKIN LESION: ICD-10-CM

## 2025-06-11 DIAGNOSIS — Z01.818 PREOP GENERAL PHYSICAL EXAM: Primary | ICD-10-CM

## 2025-06-11 DIAGNOSIS — M54.16 LUMBAR RADICULOPATHY: ICD-10-CM

## 2025-06-11 DIAGNOSIS — J44.9 CHRONIC OBSTRUCTIVE PULMONARY DISEASE, UNSPECIFIED COPD TYPE (H): ICD-10-CM

## 2025-06-11 DIAGNOSIS — M54.41 CHRONIC BILATERAL LOW BACK PAIN WITH SCIATICA, SCIATICA LATERALITY UNSPECIFIED: ICD-10-CM

## 2025-06-11 DIAGNOSIS — Z13.0 SCREENING FOR DEFICIENCY ANEMIA: ICD-10-CM

## 2025-06-11 DIAGNOSIS — I25.2 HISTORY OF MI (MYOCARDIAL INFARCTION): ICD-10-CM

## 2025-06-11 DIAGNOSIS — M54.42 CHRONIC BILATERAL LOW BACK PAIN WITH SCIATICA, SCIATICA LATERALITY UNSPECIFIED: ICD-10-CM

## 2025-06-11 DIAGNOSIS — I25.10 CORONARY ARTERY DISEASE INVOLVING NATIVE CORONARY ARTERY OF NATIVE HEART WITHOUT ANGINA PECTORIS: ICD-10-CM

## 2025-06-11 DIAGNOSIS — G89.29 CHRONIC BILATERAL LOW BACK PAIN WITH SCIATICA, SCIATICA LATERALITY UNSPECIFIED: ICD-10-CM

## 2025-06-11 DIAGNOSIS — I10 ESSENTIAL (PRIMARY) HYPERTENSION: Chronic | ICD-10-CM

## 2025-06-11 LAB
ALT SERPL W P-5'-P-CCNC: 31 U/L (ref 0–50)
ANION GAP SERPL CALCULATED.3IONS-SCNC: 9 MMOL/L (ref 7–15)
AST SERPL W P-5'-P-CCNC: 37 U/L (ref 0–45)
BUN SERPL-MCNC: 21.2 MG/DL (ref 8–23)
CALCIUM SERPL-MCNC: 9.9 MG/DL (ref 8.8–10.4)
CHLORIDE SERPL-SCNC: 103 MMOL/L (ref 98–107)
CREAT SERPL-MCNC: 0.88 MG/DL (ref 0.51–0.95)
EGFRCR SERPLBLD CKD-EPI 2021: 69 ML/MIN/1.73M2
ERYTHROCYTE [DISTWIDTH] IN BLOOD BY AUTOMATED COUNT: 13.6 % (ref 10–15)
GLUCOSE SERPL-MCNC: 109 MG/DL (ref 70–99)
HCO3 SERPL-SCNC: 27 MMOL/L (ref 22–29)
HCT VFR BLD AUTO: 42.4 % (ref 35–47)
HGB BLD-MCNC: 13.7 G/DL (ref 11.7–15.7)
HOLD SPECIMEN: NORMAL
MCH RBC QN AUTO: 26.8 PG (ref 26.5–33)
MCHC RBC AUTO-ENTMCNC: 32.3 G/DL (ref 31.5–36.5)
MCV RBC AUTO: 83 FL (ref 78–100)
PLATELET # BLD AUTO: 249 10E3/UL (ref 150–450)
POTASSIUM SERPL-SCNC: 5.6 MMOL/L (ref 3.4–5.3)
RBC # BLD AUTO: 5.11 10E6/UL (ref 3.8–5.2)
SODIUM SERPL-SCNC: 139 MMOL/L (ref 135–145)
WBC # BLD AUTO: 7.6 10E3/UL (ref 4–11)

## 2025-06-11 PROCEDURE — 36415 COLL VENOUS BLD VENIPUNCTURE: CPT | Performed by: STUDENT IN AN ORGANIZED HEALTH CARE EDUCATION/TRAINING PROGRAM

## 2025-06-11 PROCEDURE — 80048 BASIC METABOLIC PNL TOTAL CA: CPT | Performed by: STUDENT IN AN ORGANIZED HEALTH CARE EDUCATION/TRAINING PROGRAM

## 2025-06-11 PROCEDURE — 85027 COMPLETE CBC AUTOMATED: CPT | Performed by: STUDENT IN AN ORGANIZED HEALTH CARE EDUCATION/TRAINING PROGRAM

## 2025-06-11 PROCEDURE — 3074F SYST BP LT 130 MM HG: CPT | Performed by: STUDENT IN AN ORGANIZED HEALTH CARE EDUCATION/TRAINING PROGRAM

## 2025-06-11 PROCEDURE — 99214 OFFICE O/P EST MOD 30 MIN: CPT | Performed by: STUDENT IN AN ORGANIZED HEALTH CARE EDUCATION/TRAINING PROGRAM

## 2025-06-11 PROCEDURE — 93000 ELECTROCARDIOGRAM COMPLETE: CPT | Performed by: STUDENT IN AN ORGANIZED HEALTH CARE EDUCATION/TRAINING PROGRAM

## 2025-06-11 PROCEDURE — 3078F DIAST BP <80 MM HG: CPT | Performed by: STUDENT IN AN ORGANIZED HEALTH CARE EDUCATION/TRAINING PROGRAM

## 2025-06-11 PROCEDURE — 84450 TRANSFERASE (AST) (SGOT): CPT | Performed by: STUDENT IN AN ORGANIZED HEALTH CARE EDUCATION/TRAINING PROGRAM

## 2025-06-11 PROCEDURE — 84460 ALANINE AMINO (ALT) (SGPT): CPT | Performed by: STUDENT IN AN ORGANIZED HEALTH CARE EDUCATION/TRAINING PROGRAM

## 2025-06-11 PROCEDURE — 1125F AMNT PAIN NOTED PAIN PRSNT: CPT | Performed by: STUDENT IN AN ORGANIZED HEALTH CARE EDUCATION/TRAINING PROGRAM

## 2025-06-11 ASSESSMENT — PATIENT HEALTH QUESTIONNAIRE - PHQ9
SUM OF ALL RESPONSES TO PHQ QUESTIONS 1-9: 6
SUM OF ALL RESPONSES TO PHQ QUESTIONS 1-9: 6
10. IF YOU CHECKED OFF ANY PROBLEMS, HOW DIFFICULT HAVE THESE PROBLEMS MADE IT FOR YOU TO DO YOUR WORK, TAKE CARE OF THINGS AT HOME, OR GET ALONG WITH OTHER PEOPLE: SOMEWHAT DIFFICULT

## 2025-06-11 ASSESSMENT — PAIN SCALES - GENERAL: PAINLEVEL_OUTOF10: MODERATE PAIN (4)
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
## 2025-06-11 NOTE — TELEPHONE ENCOUNTER
Writer spoke with patient and relayed providers update. Verbalized understanding. No further questions.     Mahin To RN on 6/11/2025 at 12:35 PM

## 2025-06-11 NOTE — PATIENT INSTRUCTIONS
How to Take Your Medication Before Surgery  Preoperative Medication Instructions   Antiplatelet or Anticoagulation Medication Instructions   - We reviewed the medication list and the patient is not on an antiplatelet or anticoagulation medications.    Additional Medication Instructions  Take all scheduled medications on the day of surgery EXCEPT for modifications listed below:   - Herbal medications and vitamins: DO NOT TAKE 14 days prior to surgery.   - Statins (atorvastatin, simvastatin, pravastatin) : Continue taking on the day of surgery.    - celecoxib (Celebrex): DO NOT TAKE 3 days before surgery. May continue without modification for management of severe pain.    - SSRIs, SNRIs, TCAs, Antipsychotics: Continue without modification.        Patient Education   Preparing for Your Surgery  For Adults  Getting started  In most cases, a nurse will call to review your health history and instructions. They will give you an arrival time based on your scheduled surgery time. Please be ready to share:  Your doctor's clinic name and phone number  Your medical, surgical, and anesthesia history  A list of allergies and sensitivities  A list of medicines, including herbal treatments and over-the-counter drugs  Whether the patient has a legal guardian (ask how to send us the papers in advance)  Note: You may not receive a call if you were seen at our PAC (Preoperative Assessment Center).  Please tell us if you're pregnant--or if there's any chance you might be pregnant. Some surgeries may injure a fetus (unborn baby), so they require a pregnancy test. Surgeries that are safe for a fetus don't always need a test, and you can choose whether to have one.   Preparing for surgery  Within 10 to 30 days of surgery: Have a pre-op exam (sometimes called an H&P, or History and Physical). This can be done at a clinic or pre-operative center.  If you're having a , you may not need this exam. Talk to your care team.  At your  pre-op exam, talk to your care team about all medicines you take. (This includes CBD oil and any drugs, such as THC, marijuana, and other forms of cannabis.) If you need to stop any medicine before surgery, ask when to start taking it again.  This is for your safety. Many medicines and drugs can make you bleed too much during surgery. Some change how well surgery (anesthesia) drugs work.  Call your insurance company to let them know you're having surgery. (If you don't have insurance, call [phone].)  Call your clinic if there's any change in your health. This includes a scrape or scratch near the surgery site, or any signs of a cold (sore throat, runny nose, cough, rash, fever).  Eating and drinking guidelines  For your safety: Unless your surgeon tells you otherwise, follow the guidelines below.  Eat and drink as normal until 8 hours before you arrive for surgery. After that, no food or milk. You can spit out gum when you arrive.  Drink clear liquids until 2 hours before you arrive. These are liquids you can see through, like water, Gatorade, and Propel Water. They also include plain black coffee and tea (no cream or milk).  No alcohol for 24 hours before you arrive. The night before surgery, stop any drinks that contain THC.  If your care team tells you to take medicine on the morning of surgery, it's okay to take it with a sip of water. No other medicines or drugs are allowed (including CBD oil)--follow your care team's instructions.  If you have questions the day of surgery, call your hospital or surgery center.   Preventing infection  Shower or bathe the night before and the morning of surgery. Follow the instructions your clinic gave you. (If no instructions, use regular soap.)  Don't shave or clip hair near your surgery site. We'll remove the hair if needed.  Don't smoke or vape the morning of surgery. No chewing tobacco for 6 hours before you arrive. A nicotine patch is okay. You may spit out nicotine  gum when you arrive.  For some surgeries, the surgeon will tell you to fully quit smoking and nicotine.  We will make every effort to keep you safe from infection. We will:  Clean our hands often with soap and water (or an alcohol-based hand rub).  Clean the skin at your surgery site with a special soap that kills germs.  Give you a special gown to keep you warm. (Cold raises the risk of infection.)  Wear hair covers, masks, gowns, and gloves during surgery.  Give antibiotic medicine, if prescribed. Not all surgeries need this medicine.  What to bring on the day of surgery  Photo ID and insurance card  Copy of your health care directive, if you have one  Glasses and hearing aids (bring cases)  You can't wear contacts during surgery  Inhaler and eye drops, if you use them (tell us about these when you arrive)  CPAP machine or breathing device, if you use them  A few personal items, if spending the night  If you have . . .  A pacemaker, ICD (cardiac defibrillator), or other implant: Bring the ID card.  An implanted stimulator: Bring the remote control.  A legal guardian: Bring a copy of the certified (court-stamped) guardianship papers.  Please remove any jewelry, including body piercings. Leave jewelry and other valuables at home.  If you're going home the day of surgery  You must have a support person drive you home. They should stay with you overnight, and they may need to help with your self-care.  If you don't have a support person, please tells us as soon as possible. We can help.  After surgery  If it's hard to control your pain or you need more pain medicine, please call your surgeon's office.  Questions?   If you have any questions for your care team, list them here:    ____________________________________________________________________________________________________________________________________________________________________________________________________________________________________________________________  For informational purposes only. Not to replace the advice of your health care provider. Copyright   2003, 2019 [facility]. All rights reserved. Clinically reviewed by Sukhdeep Miller MD. [employer] [id number] - REV 02/25.

## 2025-06-12 ENCOUNTER — PATIENT OUTREACH (OUTPATIENT)
Dept: CARE COORDINATION | Facility: CLINIC | Age: 75
End: 2025-06-12
Payer: COMMERCIAL

## 2025-06-16 ENCOUNTER — PATIENT OUTREACH (OUTPATIENT)
Dept: CARE COORDINATION | Facility: CLINIC | Age: 75
End: 2025-06-16
Payer: COMMERCIAL

## 2025-08-04 ENCOUNTER — HOSPITAL ENCOUNTER (OUTPATIENT)
Facility: CLINIC | Age: 75
Setting detail: OBSERVATION
Discharge: HOME | End: 2025-08-04
Attending: STUDENT IN AN ORGANIZED HEALTH CARE EDUCATION/TRAINING PROGRAM | Admitting: STUDENT IN AN ORGANIZED HEALTH CARE EDUCATION/TRAINING PROGRAM
Payer: COMMERCIAL

## 2025-08-04 ENCOUNTER — APPOINTMENT (OUTPATIENT)
Dept: CT IMAGING | Facility: CLINIC | Age: 75
End: 2025-08-04
Attending: STUDENT IN AN ORGANIZED HEALTH CARE EDUCATION/TRAINING PROGRAM
Payer: COMMERCIAL

## 2025-08-04 VITALS
DIASTOLIC BLOOD PRESSURE: 79 MMHG | TEMPERATURE: 98.8 F | SYSTOLIC BLOOD PRESSURE: 150 MMHG | OXYGEN SATURATION: 96 % | HEART RATE: 77 BPM | WEIGHT: 122 LBS | HEIGHT: 63 IN | RESPIRATION RATE: 16 BRPM | BODY MASS INDEX: 21.62 KG/M2

## 2025-08-04 DIAGNOSIS — N39.0 URINARY TRACT INFECTION WITH HEMATURIA, SITE UNSPECIFIED: ICD-10-CM

## 2025-08-04 DIAGNOSIS — R31.0 GROSS HEMATURIA: ICD-10-CM

## 2025-08-04 DIAGNOSIS — N12 PYELITIS: Primary | ICD-10-CM

## 2025-08-04 DIAGNOSIS — R31.9 URINARY TRACT INFECTION WITH HEMATURIA, SITE UNSPECIFIED: ICD-10-CM

## 2025-08-04 LAB
ALBUMIN SERPL BCG-MCNC: 4.6 G/DL (ref 3.5–5.2)
ALBUMIN UR-MCNC: ABNORMAL G/DL
ALP SERPL-CCNC: 69 U/L (ref 40–150)
ALT SERPL W P-5'-P-CCNC: 26 U/L (ref 0–50)
ANION GAP SERPL CALCULATED.3IONS-SCNC: 10 MMOL/L (ref 7–15)
APPEARANCE UR: ABNORMAL
AST SERPL W P-5'-P-CCNC: 30 U/L (ref 0–45)
BACTERIA #/AREA URNS HPF: ABNORMAL /HPF
BASOPHILS # BLD AUTO: 0 10E3/UL (ref 0–0.2)
BASOPHILS NFR BLD AUTO: 0 %
BILIRUB SERPL-MCNC: 0.3 MG/DL
BILIRUB UR QL STRIP: ABNORMAL
BUN SERPL-MCNC: 18.8 MG/DL (ref 8–23)
CALCIUM SERPL-MCNC: 9.9 MG/DL (ref 8.8–10.4)
CHLORIDE SERPL-SCNC: 102 MMOL/L (ref 98–107)
CK SERPL-CCNC: 54 U/L (ref 26–192)
COLOR UR AUTO: ABNORMAL
CREAT SERPL-MCNC: 0.75 MG/DL (ref 0.51–0.95)
EGFRCR SERPLBLD CKD-EPI 2021: 84 ML/MIN/1.73M2
EOSINOPHIL # BLD AUTO: 0 10E3/UL (ref 0–0.7)
EOSINOPHIL NFR BLD AUTO: 0 %
ERYTHROCYTE [DISTWIDTH] IN BLOOD BY AUTOMATED COUNT: 14 % (ref 10–15)
GLUCOSE SERPL-MCNC: 105 MG/DL (ref 70–99)
GLUCOSE UR STRIP-MCNC: ABNORMAL MG/DL
HCO3 SERPL-SCNC: 27 MMOL/L (ref 22–29)
HCT VFR BLD AUTO: 43.8 % (ref 35–47)
HGB BLD-MCNC: 14.2 G/DL (ref 11.7–15.7)
HGB UR QL STRIP: ABNORMAL
IMM GRANULOCYTES # BLD: 0 10E3/UL
IMM GRANULOCYTES NFR BLD: 0 %
INR PPP: 0.98 (ref 0.85–1.15)
KETONES UR STRIP-MCNC: ABNORMAL MG/DL
LEUKOCYTE ESTERASE UR QL STRIP: ABNORMAL
LYMPHOCYTES # BLD AUTO: 1.1 10E3/UL (ref 0.8–5.3)
LYMPHOCYTES NFR BLD AUTO: 11 %
MAGNESIUM SERPL-MCNC: 2.1 MG/DL (ref 1.7–2.3)
MCH RBC QN AUTO: 26.8 PG (ref 26.5–33)
MCHC RBC AUTO-ENTMCNC: 32.4 G/DL (ref 31.5–36.5)
MCV RBC AUTO: 83 FL (ref 78–100)
MONOCYTES # BLD AUTO: 0.7 10E3/UL (ref 0–1.3)
MONOCYTES NFR BLD AUTO: 6 %
MUCOUS THREADS #/AREA URNS LPF: PRESENT /LPF
NEUTROPHILS # BLD AUTO: 8.3 10E3/UL (ref 1.6–8.3)
NEUTROPHILS NFR BLD AUTO: 82 %
NITRATE UR QL: ABNORMAL
NRBC # BLD AUTO: 0 10E3/UL
NRBC BLD AUTO-RTO: 0 /100
PH UR STRIP: ABNORMAL [PH]
PLATELET # BLD AUTO: 230 10E3/UL (ref 150–450)
POTASSIUM SERPL-SCNC: 5 MMOL/L (ref 3.4–5.3)
PROT SERPL-MCNC: 7.6 G/DL (ref 6.4–8.3)
PROTHROMBIN TIME: 13.1 SECONDS (ref 11.8–14.8)
RBC # BLD AUTO: 5.3 10E6/UL (ref 3.8–5.2)
RBC URINE: >182 /HPF
SODIUM SERPL-SCNC: 139 MMOL/L (ref 135–145)
SP GR UR STRIP: ABNORMAL
UROBILINOGEN UR STRIP-MCNC: ABNORMAL MG/DL
WBC # BLD AUTO: 10.1 10E3/UL (ref 4–11)
WBC URINE: >182 /HPF

## 2025-08-04 PROCEDURE — 85610 PROTHROMBIN TIME: CPT | Performed by: STUDENT IN AN ORGANIZED HEALTH CARE EDUCATION/TRAINING PROGRAM

## 2025-08-04 PROCEDURE — G0378 HOSPITAL OBSERVATION PER HR: HCPCS

## 2025-08-04 PROCEDURE — 82565 ASSAY OF CREATININE: CPT | Performed by: STUDENT IN AN ORGANIZED HEALTH CARE EDUCATION/TRAINING PROGRAM

## 2025-08-04 PROCEDURE — 96361 HYDRATE IV INFUSION ADD-ON: CPT

## 2025-08-04 PROCEDURE — 250N000009 HC RX 250: Performed by: STUDENT IN AN ORGANIZED HEALTH CARE EDUCATION/TRAINING PROGRAM

## 2025-08-04 PROCEDURE — 96375 TX/PRO/DX INJ NEW DRUG ADDON: CPT

## 2025-08-04 PROCEDURE — 83735 ASSAY OF MAGNESIUM: CPT | Performed by: STUDENT IN AN ORGANIZED HEALTH CARE EDUCATION/TRAINING PROGRAM

## 2025-08-04 PROCEDURE — 99285 EMERGENCY DEPT VISIT HI MDM: CPT | Mod: 25 | Performed by: STUDENT IN AN ORGANIZED HEALTH CARE EDUCATION/TRAINING PROGRAM

## 2025-08-04 PROCEDURE — 36415 COLL VENOUS BLD VENIPUNCTURE: CPT | Performed by: STUDENT IN AN ORGANIZED HEALTH CARE EDUCATION/TRAINING PROGRAM

## 2025-08-04 PROCEDURE — 96365 THER/PROPH/DIAG IV INF INIT: CPT

## 2025-08-04 PROCEDURE — 87040 BLOOD CULTURE FOR BACTERIA: CPT | Performed by: STUDENT IN AN ORGANIZED HEALTH CARE EDUCATION/TRAINING PROGRAM

## 2025-08-04 PROCEDURE — 99234 HOSP IP/OBS SM DT SF/LOW 45: CPT | Performed by: INTERNAL MEDICINE

## 2025-08-04 PROCEDURE — 87186 SC STD MICRODIL/AGAR DIL: CPT | Performed by: STUDENT IN AN ORGANIZED HEALTH CARE EDUCATION/TRAINING PROGRAM

## 2025-08-04 PROCEDURE — 81003 URINALYSIS AUTO W/O SCOPE: CPT | Performed by: STUDENT IN AN ORGANIZED HEALTH CARE EDUCATION/TRAINING PROGRAM

## 2025-08-04 PROCEDURE — 82550 ASSAY OF CK (CPK): CPT | Performed by: STUDENT IN AN ORGANIZED HEALTH CARE EDUCATION/TRAINING PROGRAM

## 2025-08-04 PROCEDURE — 85025 COMPLETE CBC W/AUTO DIFF WBC: CPT | Performed by: STUDENT IN AN ORGANIZED HEALTH CARE EDUCATION/TRAINING PROGRAM

## 2025-08-04 PROCEDURE — 99284 EMERGENCY DEPT VISIT MOD MDM: CPT | Performed by: STUDENT IN AN ORGANIZED HEALTH CARE EDUCATION/TRAINING PROGRAM

## 2025-08-04 PROCEDURE — 258N000003 HC RX IP 258 OP 636: Performed by: STUDENT IN AN ORGANIZED HEALTH CARE EDUCATION/TRAINING PROGRAM

## 2025-08-04 PROCEDURE — 250N000011 HC RX IP 250 OP 636: Performed by: STUDENT IN AN ORGANIZED HEALTH CARE EDUCATION/TRAINING PROGRAM

## 2025-08-04 PROCEDURE — 74177 CT ABD & PELVIS W/CONTRAST: CPT

## 2025-08-04 PROCEDURE — 250N000013 HC RX MED GY IP 250 OP 250 PS 637: Performed by: STUDENT IN AN ORGANIZED HEALTH CARE EDUCATION/TRAINING PROGRAM

## 2025-08-04 RX ORDER — OXYCODONE AND ACETAMINOPHEN 5; 325 MG/1; MG/1
1 TABLET ORAL ONCE
Refills: 0 | Status: COMPLETED | OUTPATIENT
Start: 2025-08-04 | End: 2025-08-04

## 2025-08-04 RX ORDER — HYDROMORPHONE HYDROCHLORIDE 1 MG/ML
0.5 INJECTION, SOLUTION INTRAMUSCULAR; INTRAVENOUS; SUBCUTANEOUS ONCE
Refills: 0 | Status: COMPLETED | OUTPATIENT
Start: 2025-08-04 | End: 2025-08-04

## 2025-08-04 RX ORDER — ACETAMINOPHEN 650 MG/1
650 SUPPOSITORY RECTAL EVERY 4 HOURS PRN
Status: DISCONTINUED | OUTPATIENT
Start: 2025-08-04 | End: 2025-08-04 | Stop reason: HOSPADM

## 2025-08-04 RX ORDER — NALOXONE HYDROCHLORIDE 0.4 MG/ML
0.4 INJECTION, SOLUTION INTRAMUSCULAR; INTRAVENOUS; SUBCUTANEOUS
Status: DISCONTINUED | OUTPATIENT
Start: 2025-08-04 | End: 2025-08-04 | Stop reason: HOSPADM

## 2025-08-04 RX ORDER — AMOXICILLIN 250 MG
2 CAPSULE ORAL 2 TIMES DAILY
Status: DISCONTINUED | OUTPATIENT
Start: 2025-08-04 | End: 2025-08-04

## 2025-08-04 RX ORDER — PHENAZOPYRIDINE HYDROCHLORIDE 100 MG/1
100 TABLET, FILM COATED ORAL
Status: DISCONTINUED | OUTPATIENT
Start: 2025-08-04 | End: 2025-08-04 | Stop reason: HOSPADM

## 2025-08-04 RX ORDER — BISACODYL 5 MG
10 TABLET, DELAYED RELEASE (ENTERIC COATED) ORAL DAILY PRN
Status: DISCONTINUED | OUTPATIENT
Start: 2025-08-04 | End: 2025-08-04 | Stop reason: HOSPADM

## 2025-08-04 RX ORDER — NALOXONE HYDROCHLORIDE 0.4 MG/ML
0.2 INJECTION, SOLUTION INTRAMUSCULAR; INTRAVENOUS; SUBCUTANEOUS
Status: DISCONTINUED | OUTPATIENT
Start: 2025-08-04 | End: 2025-08-04 | Stop reason: HOSPADM

## 2025-08-04 RX ORDER — IOPAMIDOL 755 MG/ML
500 INJECTION, SOLUTION INTRAVASCULAR ONCE
Status: COMPLETED | OUTPATIENT
Start: 2025-08-04 | End: 2025-08-04

## 2025-08-04 RX ORDER — ACETAMINOPHEN 325 MG/1
650 TABLET ORAL EVERY 4 HOURS PRN
Status: DISCONTINUED | OUTPATIENT
Start: 2025-08-04 | End: 2025-08-04 | Stop reason: HOSPADM

## 2025-08-04 RX ORDER — HYDROMORPHONE HYDROCHLORIDE 1 MG/ML
0.5 INJECTION, SOLUTION INTRAMUSCULAR; INTRAVENOUS; SUBCUTANEOUS
Status: DISCONTINUED | OUTPATIENT
Start: 2025-08-04 | End: 2025-08-04

## 2025-08-04 RX ORDER — ALBUTEROL SULFATE 90 UG/1
2 INHALANT RESPIRATORY (INHALATION) EVERY 6 HOURS PRN
COMMUNITY

## 2025-08-04 RX ORDER — ONDANSETRON 2 MG/ML
4 INJECTION INTRAMUSCULAR; INTRAVENOUS ONCE
Status: COMPLETED | OUTPATIENT
Start: 2025-08-04 | End: 2025-08-04

## 2025-08-04 RX ORDER — POLYETHYLENE GLYCOL 3350 17 G/17G
17 POWDER, FOR SOLUTION ORAL 2 TIMES DAILY PRN
Status: DISCONTINUED | OUTPATIENT
Start: 2025-08-04 | End: 2025-08-04 | Stop reason: HOSPADM

## 2025-08-04 RX ORDER — BISACODYL 5 MG
5 TABLET, DELAYED RELEASE (ENTERIC COATED) ORAL DAILY PRN
Status: DISCONTINUED | OUTPATIENT
Start: 2025-08-04 | End: 2025-08-04 | Stop reason: HOSPADM

## 2025-08-04 RX ORDER — SODIUM CHLORIDE 9 MG/ML
INJECTION, SOLUTION INTRAVENOUS CONTINUOUS
Status: DISCONTINUED | OUTPATIENT
Start: 2025-08-04 | End: 2025-08-04

## 2025-08-04 RX ORDER — CEFDINIR 300 MG/1
300 CAPSULE ORAL EVERY 12 HOURS
Qty: 4 CAPSULE | Refills: 0 | Status: SHIPPED | OUTPATIENT
Start: 2025-08-05 | End: 2025-08-07

## 2025-08-04 RX ORDER — OXYBUTYNIN CHLORIDE 5 MG/1
5 TABLET ORAL 3 TIMES DAILY PRN
Status: DISCONTINUED | OUTPATIENT
Start: 2025-08-04 | End: 2025-08-04 | Stop reason: HOSPADM

## 2025-08-04 RX ORDER — PHENAZOPYRIDINE HYDROCHLORIDE 100 MG/1
100 TABLET, FILM COATED ORAL
Qty: 6 TABLET | Refills: 0 | Status: SHIPPED | OUTPATIENT
Start: 2025-08-04 | End: 2025-08-06

## 2025-08-04 RX ORDER — ALBUTEROL SULFATE 90 UG/1
2 INHALANT RESPIRATORY (INHALATION) EVERY 6 HOURS PRN
Status: DISCONTINUED | OUTPATIENT
Start: 2025-08-04 | End: 2025-08-04 | Stop reason: HOSPADM

## 2025-08-04 RX ORDER — CEFTRIAXONE 1 G/1
1 INJECTION, POWDER, FOR SOLUTION INTRAMUSCULAR; INTRAVENOUS ONCE
Status: COMPLETED | OUTPATIENT
Start: 2025-08-04 | End: 2025-08-04

## 2025-08-04 RX ORDER — OXYCODONE AND ACETAMINOPHEN 5; 325 MG/1; MG/1
1 TABLET ORAL EVERY 4 HOURS PRN
Status: DISCONTINUED | OUTPATIENT
Start: 2025-08-04 | End: 2025-08-04 | Stop reason: HOSPADM

## 2025-08-04 RX ORDER — OXYBUTYNIN CHLORIDE 5 MG/1
5 TABLET ORAL 3 TIMES DAILY PRN
Qty: 6 TABLET | Refills: 0 | Status: SHIPPED | OUTPATIENT
Start: 2025-08-04

## 2025-08-04 RX ORDER — KETOROLAC TROMETHAMINE 15 MG/ML
15 INJECTION, SOLUTION INTRAMUSCULAR; INTRAVENOUS ONCE
Status: COMPLETED | OUTPATIENT
Start: 2025-08-04 | End: 2025-08-04

## 2025-08-04 RX ORDER — KETOROLAC TROMETHAMINE 15 MG/ML
15 INJECTION, SOLUTION INTRAMUSCULAR; INTRAVENOUS EVERY 6 HOURS
Status: DISCONTINUED | OUTPATIENT
Start: 2025-08-04 | End: 2025-08-04

## 2025-08-04 RX ORDER — AMOXICILLIN 250 MG
1 CAPSULE ORAL 2 TIMES DAILY
Status: DISCONTINUED | OUTPATIENT
Start: 2025-08-04 | End: 2025-08-04

## 2025-08-04 RX ORDER — RAMELTEON 8 MG/1
8 TABLET ORAL AT BEDTIME
Status: DISCONTINUED | OUTPATIENT
Start: 2025-08-04 | End: 2025-08-04 | Stop reason: HOSPADM

## 2025-08-04 RX ORDER — BISACODYL 10 MG
10 SUPPOSITORY, RECTAL RECTAL DAILY PRN
Status: DISCONTINUED | OUTPATIENT
Start: 2025-08-04 | End: 2025-08-04 | Stop reason: HOSPADM

## 2025-08-04 RX ORDER — PANTOPRAZOLE SODIUM 40 MG/1
40 TABLET, DELAYED RELEASE ORAL DAILY
Status: DISCONTINUED | OUTPATIENT
Start: 2025-08-04 | End: 2025-08-04 | Stop reason: HOSPADM

## 2025-08-04 RX ORDER — SERTRALINE HYDROCHLORIDE 100 MG/1
100 TABLET, FILM COATED ORAL DAILY
Status: DISCONTINUED | OUTPATIENT
Start: 2025-08-04 | End: 2025-08-04

## 2025-08-04 RX ORDER — ONDANSETRON 4 MG/1
4 TABLET, ORALLY DISINTEGRATING ORAL EVERY 6 HOURS PRN
Status: DISCONTINUED | OUTPATIENT
Start: 2025-08-04 | End: 2025-08-04 | Stop reason: HOSPADM

## 2025-08-04 RX ORDER — CEFDINIR 300 MG/1
300 CAPSULE ORAL EVERY 12 HOURS SCHEDULED
Status: DISCONTINUED | OUTPATIENT
Start: 2025-08-05 | End: 2025-08-04 | Stop reason: HOSPADM

## 2025-08-04 RX ORDER — ONDANSETRON 2 MG/ML
4 INJECTION INTRAMUSCULAR; INTRAVENOUS EVERY 6 HOURS PRN
Status: DISCONTINUED | OUTPATIENT
Start: 2025-08-04 | End: 2025-08-04 | Stop reason: HOSPADM

## 2025-08-04 RX ORDER — ROSUVASTATIN CALCIUM 20 MG/1
40 TABLET, COATED ORAL AT BEDTIME
Status: DISCONTINUED | OUTPATIENT
Start: 2025-08-04 | End: 2025-08-04 | Stop reason: HOSPADM

## 2025-08-04 RX ORDER — PROCHLORPERAZINE MALEATE 5 MG/1
5 TABLET ORAL EVERY 6 HOURS PRN
Status: DISCONTINUED | OUTPATIENT
Start: 2025-08-04 | End: 2025-08-04 | Stop reason: HOSPADM

## 2025-08-04 RX ADMIN — OXYCODONE HYDROCHLORIDE AND ACETAMINOPHEN 1 TABLET: 5; 325 TABLET ORAL at 14:35

## 2025-08-04 RX ADMIN — CEFTRIAXONE SODIUM 1 G: 1 INJECTION, POWDER, FOR SOLUTION INTRAMUSCULAR; INTRAVENOUS at 16:17

## 2025-08-04 RX ADMIN — SODIUM CHLORIDE 60 ML: 9 INJECTION, SOLUTION INTRAVENOUS at 15:24

## 2025-08-04 RX ADMIN — ONDANSETRON 4 MG: 2 INJECTION, SOLUTION INTRAMUSCULAR; INTRAVENOUS at 14:33

## 2025-08-04 RX ADMIN — IOPAMIDOL 60 ML: 755 INJECTION, SOLUTION INTRAVENOUS at 15:24

## 2025-08-04 RX ADMIN — KETOROLAC TROMETHAMINE 15 MG: 15 INJECTION, SOLUTION INTRAMUSCULAR; INTRAVENOUS at 17:15

## 2025-08-04 RX ADMIN — HYDROMORPHONE HYDROCHLORIDE 0.5 MG: 1 INJECTION, SOLUTION INTRAMUSCULAR; INTRAVENOUS; SUBCUTANEOUS at 17:16

## 2025-08-04 RX ADMIN — SODIUM CHLORIDE 1000 ML: 0.9 INJECTION, SOLUTION INTRAVENOUS at 16:17

## 2025-08-04 RX ADMIN — SODIUM CHLORIDE 1000 ML: 0.9 INJECTION, SOLUTION INTRAVENOUS at 14:27

## 2025-08-04 ASSESSMENT — ACTIVITIES OF DAILY LIVING (ADL)
ADLS_ACUITY_SCORE: 41

## 2025-08-05 LAB — BACTERIA UR CULT: ABNORMAL

## 2025-08-07 LAB
BACTERIA SPEC CULT: NORMAL
BACTERIA SPEC CULT: NORMAL

## 2025-08-09 LAB
BACTERIA SPEC CULT: NO GROWTH
BACTERIA SPEC CULT: NO GROWTH